# Patient Record
Sex: MALE | Race: WHITE | NOT HISPANIC OR LATINO | URBAN - METROPOLITAN AREA
[De-identification: names, ages, dates, MRNs, and addresses within clinical notes are randomized per-mention and may not be internally consistent; named-entity substitution may affect disease eponyms.]

---

## 2022-05-27 ENCOUNTER — INPATIENT (INPATIENT)
Facility: HOSPITAL | Age: 67
LOS: 3 days | Discharge: HOME | DRG: 313 | End: 2022-05-31
Attending: STUDENT IN AN ORGANIZED HEALTH CARE EDUCATION/TRAINING PROGRAM | Admitting: STUDENT IN AN ORGANIZED HEALTH CARE EDUCATION/TRAINING PROGRAM
Payer: OTHER GOVERNMENT

## 2022-05-27 VITALS
HEIGHT: 69 IN | TEMPERATURE: 98 F | RESPIRATION RATE: 20 BRPM | SYSTOLIC BLOOD PRESSURE: 171 MMHG | WEIGHT: 229.94 LBS | DIASTOLIC BLOOD PRESSURE: 101 MMHG | OXYGEN SATURATION: 95 % | HEART RATE: 81 BPM

## 2022-05-27 DIAGNOSIS — R07.9 CHEST PAIN, UNSPECIFIED: ICD-10-CM

## 2022-05-27 DIAGNOSIS — Z96.641 PRESENCE OF RIGHT ARTIFICIAL HIP JOINT: Chronic | ICD-10-CM

## 2022-05-27 LAB
ALBUMIN SERPL ELPH-MCNC: 3.4 G/DL — SIGNIFICANT CHANGE UP (ref 3.3–5)
ALP SERPL-CCNC: 57 U/L — SIGNIFICANT CHANGE UP (ref 40–120)
ALT FLD-CCNC: 36 U/L — SIGNIFICANT CHANGE UP (ref 10–45)
ANION GAP SERPL CALC-SCNC: 6 MMOL/L — SIGNIFICANT CHANGE UP (ref 5–17)
AST SERPL-CCNC: 32 U/L — SIGNIFICANT CHANGE UP (ref 10–40)
BASOPHILS # BLD AUTO: 0.06 K/UL — SIGNIFICANT CHANGE UP (ref 0–0.2)
BASOPHILS NFR BLD AUTO: 0.5 % — SIGNIFICANT CHANGE UP (ref 0–2)
BILIRUB SERPL-MCNC: 0.5 MG/DL — SIGNIFICANT CHANGE UP (ref 0.2–1.2)
BUN SERPL-MCNC: 13 MG/DL — SIGNIFICANT CHANGE UP (ref 7–23)
CALCIUM SERPL-MCNC: 8.1 MG/DL — LOW (ref 8.4–10.5)
CHLORIDE SERPL-SCNC: 94 MMOL/L — LOW (ref 96–108)
CO2 SERPL-SCNC: 29 MMOL/L — SIGNIFICANT CHANGE UP (ref 22–31)
CREAT SERPL-MCNC: 0.97 MG/DL — SIGNIFICANT CHANGE UP (ref 0.5–1.3)
D DIMER BLD IA.RAPID-MCNC: 255 NG/ML DDU — HIGH
EGFR: 87 ML/MIN/1.73M2 — SIGNIFICANT CHANGE UP
EOSINOPHIL # BLD AUTO: 0.26 K/UL — SIGNIFICANT CHANGE UP (ref 0–0.5)
EOSINOPHIL NFR BLD AUTO: 2.3 % — SIGNIFICANT CHANGE UP (ref 0–6)
GLUCOSE SERPL-MCNC: 97 MG/DL — SIGNIFICANT CHANGE UP (ref 70–99)
HCT VFR BLD CALC: 39.3 % — SIGNIFICANT CHANGE UP (ref 39–50)
HGB BLD-MCNC: 13.7 G/DL — SIGNIFICANT CHANGE UP (ref 13–17)
IMM GRANULOCYTES NFR BLD AUTO: 1 % — SIGNIFICANT CHANGE UP (ref 0–1.5)
LYMPHOCYTES # BLD AUTO: 17.7 % — SIGNIFICANT CHANGE UP (ref 13–44)
LYMPHOCYTES # BLD AUTO: 2.01 K/UL — SIGNIFICANT CHANGE UP (ref 1–3.3)
MCHC RBC-ENTMCNC: 27.7 PG — SIGNIFICANT CHANGE UP (ref 27–34)
MCHC RBC-ENTMCNC: 34.9 GM/DL — SIGNIFICANT CHANGE UP (ref 32–36)
MCV RBC AUTO: 79.6 FL — LOW (ref 80–100)
MONOCYTES # BLD AUTO: 1.06 K/UL — HIGH (ref 0–0.9)
MONOCYTES NFR BLD AUTO: 9.4 % — SIGNIFICANT CHANGE UP (ref 2–14)
NEUTROPHILS # BLD AUTO: 7.83 K/UL — HIGH (ref 1.8–7.4)
NEUTROPHILS NFR BLD AUTO: 69.1 % — SIGNIFICANT CHANGE UP (ref 43–77)
NRBC # BLD: 0 /100 WBCS — SIGNIFICANT CHANGE UP (ref 0–0)
PLATELET # BLD AUTO: 302 K/UL — SIGNIFICANT CHANGE UP (ref 150–400)
POTASSIUM SERPL-MCNC: 3.7 MMOL/L — SIGNIFICANT CHANGE UP (ref 3.5–5.3)
POTASSIUM SERPL-SCNC: 3.7 MMOL/L — SIGNIFICANT CHANGE UP (ref 3.5–5.3)
PROT SERPL-MCNC: 6.5 G/DL — SIGNIFICANT CHANGE UP (ref 6–8.3)
RBC # BLD: 4.94 M/UL — SIGNIFICANT CHANGE UP (ref 4.2–5.8)
RBC # FLD: 12.3 % — SIGNIFICANT CHANGE UP (ref 10.3–14.5)
SARS-COV-2 RNA SPEC QL NAA+PROBE: SIGNIFICANT CHANGE UP
SODIUM SERPL-SCNC: 129 MMOL/L — LOW (ref 135–145)
TROPONIN I, HIGH SENSITIVITY RESULT: 27.5 NG/L — SIGNIFICANT CHANGE UP
TROPONIN I, HIGH SENSITIVITY RESULT: 33.2 NG/L — SIGNIFICANT CHANGE UP
WBC # BLD: 11.33 K/UL — HIGH (ref 3.8–10.5)
WBC # FLD AUTO: 11.33 K/UL — HIGH (ref 3.8–10.5)

## 2022-05-27 PROCEDURE — 71045 X-RAY EXAM CHEST 1 VIEW: CPT | Mod: 26

## 2022-05-27 PROCEDURE — 99223 1ST HOSP IP/OBS HIGH 75: CPT

## 2022-05-27 PROCEDURE — 99285 EMERGENCY DEPT VISIT HI MDM: CPT

## 2022-05-27 PROCEDURE — 93010 ELECTROCARDIOGRAM REPORT: CPT

## 2022-05-27 RX ORDER — CHLORTHALIDONE 50 MG
1 TABLET ORAL
Qty: 0 | Refills: 0 | DISCHARGE

## 2022-05-27 RX ORDER — ACETAMINOPHEN 500 MG
650 TABLET ORAL EVERY 6 HOURS
Refills: 0 | Status: DISCONTINUED | OUTPATIENT
Start: 2022-05-27 | End: 2022-05-31

## 2022-05-27 RX ORDER — ASPIRIN/CALCIUM CARB/MAGNESIUM 324 MG
81 TABLET ORAL DAILY
Refills: 0 | Status: DISCONTINUED | OUTPATIENT
Start: 2022-05-28 | End: 2022-05-31

## 2022-05-27 RX ORDER — ASPIRIN/CALCIUM CARB/MAGNESIUM 324 MG
1 TABLET ORAL
Qty: 0 | Refills: 0 | DISCHARGE

## 2022-05-27 RX ORDER — LANOLIN ALCOHOL/MO/W.PET/CERES
3 CREAM (GRAM) TOPICAL AT BEDTIME
Refills: 0 | Status: DISCONTINUED | OUTPATIENT
Start: 2022-05-27 | End: 2022-05-31

## 2022-05-27 RX ORDER — LISINOPRIL 2.5 MG/1
1 TABLET ORAL
Qty: 0 | Refills: 0 | DISCHARGE

## 2022-05-27 RX ORDER — ONDANSETRON 8 MG/1
4 TABLET, FILM COATED ORAL EVERY 8 HOURS
Refills: 0 | Status: DISCONTINUED | OUTPATIENT
Start: 2022-05-27 | End: 2022-05-31

## 2022-05-27 RX ORDER — ATORVASTATIN CALCIUM 80 MG/1
80 TABLET, FILM COATED ORAL AT BEDTIME
Refills: 0 | Status: DISCONTINUED | OUTPATIENT
Start: 2022-05-27 | End: 2022-05-31

## 2022-05-27 RX ORDER — ENOXAPARIN SODIUM 100 MG/ML
40 INJECTION SUBCUTANEOUS EVERY 24 HOURS
Refills: 0 | Status: DISCONTINUED | OUTPATIENT
Start: 2022-05-27 | End: 2022-05-31

## 2022-05-27 RX ORDER — ATORVASTATIN CALCIUM 80 MG/1
1 TABLET, FILM COATED ORAL
Qty: 0 | Refills: 0 | DISCHARGE

## 2022-05-27 RX ORDER — LISINOPRIL 2.5 MG/1
40 TABLET ORAL DAILY
Refills: 0 | Status: DISCONTINUED | OUTPATIENT
Start: 2022-05-27 | End: 2022-05-31

## 2022-05-27 RX ORDER — HYDROCHLOROTHIAZIDE 25 MG
25 TABLET ORAL DAILY
Refills: 0 | Status: DISCONTINUED | OUTPATIENT
Start: 2022-05-27 | End: 2022-05-31

## 2022-05-27 RX ORDER — ASPIRIN/CALCIUM CARB/MAGNESIUM 324 MG
81 TABLET ORAL DAILY
Refills: 0 | Status: DISCONTINUED | OUTPATIENT
Start: 2022-05-27 | End: 2022-05-27

## 2022-05-27 RX ADMIN — ATORVASTATIN CALCIUM 80 MILLIGRAM(S): 80 TABLET, FILM COATED ORAL at 22:05

## 2022-05-27 RX ADMIN — ENOXAPARIN SODIUM 40 MILLIGRAM(S): 100 INJECTION SUBCUTANEOUS at 22:04

## 2022-05-27 NOTE — H&P ADULT - NSHPREVIEWOFSYSTEMS_GEN_ALL_CORE
CONSTITUTIONAL: denies fever, chills, fatigue, weakness  HEENT: denies blurred vision, sore throat  SKIN: denies new lesions, rash  CARDIOVASCULAR: denies chest pain, chest pressure, palpitations  RESPIRATORY: denies shortness of breath, sputum production  GASTROINTESTINAL: denies nausea, vomiting, diarrhea, abdominal pain  GENITOURINARY: denies dysuria, discharge  NEUROLOGICAL: denies numbness, headache, focal weakness  MUSCULOSKELETAL: denies new joint pain, muscle aches  HEMATOLOGIC: denies gross bleeding, bruising  LYMPHATICS: denies enlarged lymph nodes, extremity swelling  PSYCHIATRIC: denies recent changes in anxiety, depression  ENDOCRINOLOGIC: denies sweating, cold or heat intolerance CONSTITUTIONAL: denies fever, chills, fatigue, weakness  HEENT: denies blurred vision, sore throat  SKIN: denies new lesions, rash  CARDIOVASCULAR: admits to chest discomfort, denies chest pressure, palpitations  RESPIRATORY: denies shortness of breath, sputum production  GASTROINTESTINAL: denies nausea, vomiting, diarrhea, abdominal pain  GENITOURINARY: denies dysuria, discharge  NEUROLOGICAL: denies numbness, headache, focal weakness  MUSCULOSKELETAL: denies new joint pain, muscle aches  LYMPHATICS: denies enlarged lymph nodes, extremity swelling  PSYCHIATRIC: denies recent changes in anxiety, depression  ENDOCRINOLOGIC: denies sweating, cold or heat intolerance

## 2022-05-27 NOTE — ED ADULT NURSE NOTE - OBJECTIVE STATEMENT
Pt a&ox3 BIB EMS c/o chest pain. Per pt, he was admitted to [facility] x2 days for elevated troponins. Per EMS pt was found diaphoretic and pale at home. given 324 ASA PTA.

## 2022-05-27 NOTE — ED ADULT TRIAGE NOTE - CHIEF COMPLAINT QUOTE
Pt brought in by EMS c/o mild left sided, chest tightness. As per EMS, Pt found diaphoretic and pale at home.  done by EMS. EMS gave 324mg of aspirin.     Pt reports recently being seen at [facility] in [place], admitted x 2 days for elevated trops.

## 2022-05-27 NOTE — ED ADULT NURSE NOTE - NSIMPLEMENTINTERV_GEN_ALL_ED
Implemented All Universal Safety Interventions:  [place] to call system. Call bell, personal items and telephone within reach. Instruct patient to call for assistance. Room bathroom lighting operational. Non-slip footwear when patient is off stretcher. Physically safe environment: no spills, clutter or unnecessary equipment. Stretcher in lowest position, wheels locked, appropriate side rails in place.

## 2022-05-27 NOTE — PATIENT PROFILE ADULT - FALL HARM RISK - UNIVERSAL INTERVENTIONS
Bed in lowest position, wheels locked, appropriate side rails in place/Call bell, personal items and telephone in reach/Instruct patient to call for assistance before getting out of bed or chair/Non-slip footwear when patient is out of bed/[place] to call system/Physically safe environment - no spills, clutter or unnecessary equipment/Purposeful Proactive Rounding/Room/bathroom lighting operational, light cord in reach

## 2022-05-27 NOTE — ED PROVIDER NOTE - OBJECTIVE STATEMENT
66M h/o HTN, occasional ETOH use, [facility], had negative work up for PE, but per pt had +troponin, here today for intermittent left sided chest tightness radiating to LUE at 1pm, lasting a few mins, associated with diaphoresis, sob, nausea, exertional. Pt has not had a cardiac work up x 5 years. No pleuritic chest pain, no fevers or chills, no cough.

## 2022-05-27 NOTE — H&P ADULT - NSHPPHYSICALEXAM_GEN_ALL_CORE
T(C): 36.9 (05-27-22 @ 14:27), Max: 36.9 (05-27-22 @ 14:27)  HR: 81 (05-27-22 @ 14:27) (81 - 81)  BP: 171/101 (05-27-22 @ 14:27) (171/101 - 171/101)  RR: 20 (05-27-22 @ 14:27) (20 - 20)  SpO2: 95% (05-27-22 @ 14:27) (95% - 95%)    GENERAL: patient appears well, no acute distress, appropriate, pleasant  EYES: sclera clear, no exudates  ENMT: oropharynx clear without erythema, no exudates, moist mucous membranes  NECK: supple, soft, no thyromegaly noted  LUNGS: good air entry bilaterally, clear to auscultation, symmetric breath sounds, no wheezing or rhonchi appreciated  HEART: soft S1/S2, regular rate and rhythm, no murmurs noted, no lower extremity edema  GASTROINTESTINAL: abdomen is soft, nontender, nondistended, normoactive bowel sounds, no palpable masses  INTEGUMENT: good skin turgor, no lesions noted  MUSCULOSKELETAL: no clubbing or cyanosis, no obvious deformity  NEUROLOGIC: awake, alert, oriented x3, good muscle tone in 4 extremities, no obvious sensory deficits  PSYCHIATRIC: mood is good, affect is congruent, linear and logical thought process  HEME/LYMPH: no palpable supraclavicular nodules, no obvious ecchymosis or petechiae T(C): 36.9 (05-27-22 @ 14:27), Max: 36.9 (05-27-22 @ 14:27)  HR: 81 (05-27-22 @ 14:27) (81 - 81)  BP: 171/101 (05-27-22 @ 14:27) (171/101 - 171/101)  RR: 20 (05-27-22 @ 14:27) (20 - 20)  SpO2: 95% (05-27-22 @ 14:27) (95% - 95%)    GENERAL: patient appears well, no acute distress, appropriate, pleasant  EYES: sclera clear, no exudates  ENMT: oropharynx clear without erythema, no exudates, moist mucous membranes  NECK: supple, soft, no thyromegaly noted  LUNGS: good air entry bilaterally, clear to auscultation, symmetric breath sounds, no wheezing or rhonchi appreciated  HEART: soft S1/S2, regular rate and rhythm, no murmurs noted, no lower extremity edema, no tenderness to palpation  GASTROINTESTINAL: abdomen is soft, nontender, nondistended, normoactive bowel sounds, no palpable masses  INTEGUMENT: warm and perfused, tanned  MUSCULOSKELETAL: no clubbing or cyanosis, no obvious deformity  NEUROLOGIC: awake, alert, oriented x3, good muscle tone in 4 extremities, no obvious sensory deficits  PSYCHIATRIC: mood is good, affect is congruent, linear and logical thought process

## 2022-05-27 NOTE — H&P ADULT - NSHPSOCIALHISTORY_GEN_ALL_CORE
Lives at home with wife, live in [place]  Ambulates and performs all ADLs on his own  Denies smoking, illicit drug use, admits to occasional EtOH  Retired- owned car washing business

## 2022-05-27 NOTE — H&P ADULT - HISTORY OF PRESENT ILLNESS
66 year old M PMH HTN, occasional EtOH use coming in for chest tightness that started around 1pm and radiates to LUE. Patient states that he was admitted to [facility] in [place] last week for SOB after long car ride, workup was negative for PE as per patient but was noted to have elevated trop and work up was negative. Chest pain not present last week, associated with diaphoresis and SOB, pain is exertional and not present at rest. Patient states he has not had any cardiac workup done in 5 years. Denies fevers, chills, palpitations, abd pain.    In the ED, T 98.5F, HR 81, /101, RR 20, SpO2 95% on RA. Labs showed WBC 11.33, Na 129, trop 27.5.     EKG: ______  CXR: no acute pathology on acute read 66 year old M PMH HTN, occasional EtOH use coming in for chest tightness that started around 1pm. Patient states that he was admitted to [facility] in [place] last week for SOB after long car ride (patient drove from [place] for nephew's wedding today), workup was negative for PE as per patient but was noted to have elevated trop and work up was negative. Patient states that he always has had L sided pectoralis muscle pain but today was worse, associated with diaphoresis and SOB, pain is exertional and not present at rest. Did not radiate to jaw or back or extremities. Patient states he has not had any cardiac workup done in 5 years. Denies fevers, chills, palpitations, abd pain. Wife, Miryam, at bedside states that patient was walking about 0.5 mile this morning and did feel "weird."     In the ED, T 98.5F, HR 81, /101, RR 20, SpO2 95% on RA. Labs showed WBC 11.33, Na 129, trop 27.5.     EKG: NSR HR 75  CXR: no acute pathology on acute read 66 year old M PMH HTN, occasional EtOH use coming in for chest tightness that started around 1pm and has been consistent since. Patient states that he was admitted to [facility] in [place] last week for SOB after long car ride (patient drove from [place] for nephew's wedding today), workup was negative for PE as per patient but was noted to have elevated trop and work up was negative. Patient states that he always has had L sided pectoralis muscle pain but today was worse, associated with diaphoresis and SOB, pain is exertional and not present at rest. Did not radiate to jaw or back or extremities. Patient states he has not had any cardiac workup done in 5 years. Denies fevers, chills, palpitations, abd pain. Wife, Miryam, at bedside states that patient was walking about 0.5 mile this morning and did feel "weird."     In the ED, T 98.5F, HR 81, /101, RR 20, SpO2 95% on RA. Labs showed WBC 11.33, Na 129, trop 27.5.     EKG: NSR HR 75  CXR: no acute pathology on acute read 66 year old M PMH HTN, occasional EtOH use coming in for chest tightness that started around 1pm and has been consistent since. Patient states that he was admitted to [facility] in [place] last week for SOB after long car ride (patient drove from [place] for nephew's wedding today), workup was negative for PE as per patient but was noted to have elevated trop and work up was negative. Patient states that he always has had L sided pectoralis muscle pain but today was worse, associated with diaphoresis and SOB, pain is exertional and not present at rest. Did not radiate to jaw or back or extremities. Patient states he has not had any cardiac workup done in 5 years. Denies fevers, chills, palpitations, abd pain. Wife, Miryam, at bedside states that patient was walking about 0.5 mile this morning and did feel "weird."     In the ED, T 98.5F, HR 81, /101, RR 20, SpO2 95% on RA. Labs showed WBC 11.33, Na 129, trop 27.5. Received asa 324mg in the ambulance.    EKG: NSR HR 75  CXR: no acute pathology on acute read

## 2022-05-27 NOTE — ED PROVIDER NOTE - NS ED MD DISPO ISOLATION TYPES
[FreeTextEntry1] : using Advair intermittently\par dieting\par former smoker 20 yrs , quit 30 years ago\par has moderate LUPE ( AHI 28) , cpap 15- intermittently compliant, followed [facility] sleep\par follows with Dr Dickson  Cardiology\par no fever, chill, chest pain\par no sig sputum\par works as 
None

## 2022-05-27 NOTE — ED ADULT NURSE NOTE - CHIEF COMPLAINT QUOTE
Pt brought in by EMS c/o mild left sided, chest tightness. As per EMS, Pt found diaphoretic and pale at home.  done by EMS. EMS gave 324mg of aspirin.     Pt reports recently being seen at [facility] in [place], admitted x 2 days for elevated trops.

## 2022-05-27 NOTE — H&P ADULT - ASSESSMENT
66 year old M PMH HTN, occasional EtOH use coming in for chest tightness radiating to the LUE admitted for typical chest pain    #chest pain, r/o ACS  - admit to tele  - patient with L sided chest pain, radiating to LUE and associated with diaphoresis, will workup for r/o ACS  - first trop 27.5, follow up repeat trop and until peak  - EKG with no acute concern findings  - patient admitted last week to [facility] for PE workup, noted to have elevated trops at that time as well  - chest pain could be possible in setting of elevated BP  - follow up dimer as patient with history of long car ride  - follow up echo  - cardio consulted    #HTN  - continue home _____    #DVT ppx  - lovenox 40mg daily 66 year old M PMH HTN, occasional EtOH use coming in for chest tightness admitted for typical chest pain    #chest pain, r/o ACS  - admit to tele  - patient with L sided chest pain, associated with diaphoresis, will workup for r/o ACS  - family history significant for father with MI,  when he was 64  - EKG with no acute concern findings  - patient admitted last week to [facility] for PE workup, noted to have elevated trops at that time as well  - chest pain could be possible in setting of elevated BP  - continue home asa  - first trop 27.5, follow up repeat trop and until peak  - follow up dimer as patient with history of long car ride  - follow up echo  - follow up prBNP  - cardio consulted    #HTN  #HLD  - continue home lisinopril 40mg daily  - on chlorthalidone 25mg at home, will place on HCTZ 25mg daily and dose one STAT for elevated BP  - continue atorvastatin 80mg daily    #DVT ppx  - lovenox 40mg daily    discussed with wife Miryam at bedside, all questions answered 66 year old M PMH HTN, occasional EtOH use coming in for chest tightness admitted for typical chest pain    #chest pain, r/o ACS  - admit to tele  - patient with L sided chest pain, associated with diaphoresis, will workup for r/o ACS  - family history significant for father with MI,  when he was 64  - EKG with no acute concern findings  - patient admitted last week to [facility] for PE workup, noted to have elevated trops at that time as well  - chest pain could be possible in setting of elevated BP  - continue home asa  - first trop 27.5, follow up repeat trop and until peak  - follow up dimer as patient with history of long car ride  - follow up echo  - follow up proBNP  - cardio consulted    #HTN  #HLD  - continue home lisinopril 40mg daily  - on chlorthalidone 25mg at home, will place on HCTZ 25mg daily and dose one STAT for elevated BP  - continue atorvastatin 80mg daily    #DVT ppx  - lovenox 40mg daily    discussed with wife Miryam at bedside, all questions answered 66 year old M PMH HTN, occasional EtOH use coming in for chest tightness admitted for typical chest pain    #chest pain, r/o ACS  - admit to tele  - patient with L sided chest pain, associated with diaphoresis, will workup for r/o ACS  - family history significant for father with MI,  when he was 64  - EKG with no acute concern findings  - patient admitted last week to [facility] for PE workup, noted to have elevated trops at that time as well  - chest pain could be possible in setting of elevated BP  - continue home asa  - first trop 27.5, follow up repeat trop and until peak  - follow up dimer as patient with history of long car ride  - follow up echo  - follow up proBNP  - follow up EKG in the AM  - cardio consulted- called service    #HTN  #HLD  - continue home lisinopril 40mg daily  - on chlorthalidone 25mg at home, will place on HCTZ 25mg daily and dose one STAT for elevated BP  - continue atorvastatin 80mg daily    #DVT ppx  - lovenox 40mg daily    discussed with wife Miryam at bedside, all questions answered 66 year old M PMH HTN, occasional EtOH use coming in for chest tightness admitted for chest pain r/o ACS    #chest pain, r/o ACS  - admit to tele  - patient with L sided chest pain, associated with diaphoresis, will workup for r/o ACS  - family history significant for father with MI,  when he was 64  - EKG with no acute concern findings  - patient admitted last week to [facility] for PE workup, noted to have elevated trops at that time as well  - chest pain could be possible in setting of elevated BP  - continue home asa  - first trop 27.5, follow up repeat trop and until peak  - follow up dimer as patient with history of long car ride  - follow up echo  - follow up proBNP  - follow up EKG in the AM  - cardio consulted- called service    #HTN  #HLD  - continue home lisinopril 40mg daily  - on chlorthalidone 25mg at home, will place on HCTZ 25mg daily and dose one STAT for elevated BP  - continue atorvastatin 80mg daily    #DVT ppx  - lovenox 40mg daily    discussed with wife Miryam at bedside, all questions answered

## 2022-05-28 LAB
ALBUMIN SERPL ELPH-MCNC: 3.2 G/DL — LOW (ref 3.3–5)
ALP SERPL-CCNC: 57 U/L — SIGNIFICANT CHANGE UP (ref 40–120)
ALT FLD-CCNC: 25 U/L — SIGNIFICANT CHANGE UP (ref 10–45)
ANION GAP SERPL CALC-SCNC: 9 MMOL/L — SIGNIFICANT CHANGE UP (ref 5–17)
AST SERPL-CCNC: 27 U/L — SIGNIFICANT CHANGE UP (ref 10–40)
BASOPHILS # BLD AUTO: 0.08 K/UL — SIGNIFICANT CHANGE UP (ref 0–0.2)
BASOPHILS NFR BLD AUTO: 0.9 % — SIGNIFICANT CHANGE UP (ref 0–2)
BILIRUB SERPL-MCNC: 0.9 MG/DL — SIGNIFICANT CHANGE UP (ref 0.2–1.2)
BUN SERPL-MCNC: 13 MG/DL — SIGNIFICANT CHANGE UP (ref 7–23)
CALCIUM SERPL-MCNC: 8.9 MG/DL — SIGNIFICANT CHANGE UP (ref 8.4–10.5)
CHLORIDE SERPL-SCNC: 98 MMOL/L — SIGNIFICANT CHANGE UP (ref 96–108)
CO2 SERPL-SCNC: 29 MMOL/L — SIGNIFICANT CHANGE UP (ref 22–31)
CREAT SERPL-MCNC: 1.04 MG/DL — SIGNIFICANT CHANGE UP (ref 0.5–1.3)
EGFR: 80 ML/MIN/1.73M2 — SIGNIFICANT CHANGE UP
EOSINOPHIL # BLD AUTO: 0.59 K/UL — HIGH (ref 0–0.5)
EOSINOPHIL NFR BLD AUTO: 6.3 % — HIGH (ref 0–6)
GLUCOSE SERPL-MCNC: 93 MG/DL — SIGNIFICANT CHANGE UP (ref 70–99)
HCT VFR BLD CALC: 39.4 % — SIGNIFICANT CHANGE UP (ref 39–50)
HCV AB S/CO SERPL IA: 0.19 S/CO — SIGNIFICANT CHANGE UP (ref 0–0.99)
HCV AB SERPL-IMP: SIGNIFICANT CHANGE UP
HGB BLD-MCNC: 13.6 G/DL — SIGNIFICANT CHANGE UP (ref 13–17)
IMM GRANULOCYTES NFR BLD AUTO: 0.9 % — SIGNIFICANT CHANGE UP (ref 0–1.5)
LYMPHOCYTES # BLD AUTO: 2.3 K/UL — SIGNIFICANT CHANGE UP (ref 1–3.3)
LYMPHOCYTES # BLD AUTO: 24.5 % — SIGNIFICANT CHANGE UP (ref 13–44)
MCHC RBC-ENTMCNC: 27.5 PG — SIGNIFICANT CHANGE UP (ref 27–34)
MCHC RBC-ENTMCNC: 34.5 GM/DL — SIGNIFICANT CHANGE UP (ref 32–36)
MCV RBC AUTO: 79.6 FL — LOW (ref 80–100)
MONOCYTES # BLD AUTO: 0.83 K/UL — SIGNIFICANT CHANGE UP (ref 0–0.9)
MONOCYTES NFR BLD AUTO: 8.8 % — SIGNIFICANT CHANGE UP (ref 2–14)
NEUTROPHILS # BLD AUTO: 5.51 K/UL — SIGNIFICANT CHANGE UP (ref 1.8–7.4)
NEUTROPHILS NFR BLD AUTO: 58.6 % — SIGNIFICANT CHANGE UP (ref 43–77)
NRBC # BLD: 0 /100 WBCS — SIGNIFICANT CHANGE UP (ref 0–0)
NT-PROBNP SERPL-SCNC: 652 PG/ML — HIGH (ref 0–300)
PLATELET # BLD AUTO: 264 K/UL — SIGNIFICANT CHANGE UP (ref 150–400)
POTASSIUM SERPL-MCNC: 4.1 MMOL/L — SIGNIFICANT CHANGE UP (ref 3.5–5.3)
POTASSIUM SERPL-SCNC: 4.1 MMOL/L — SIGNIFICANT CHANGE UP (ref 3.5–5.3)
PROT SERPL-MCNC: 6.5 G/DL — SIGNIFICANT CHANGE UP (ref 6–8.3)
RBC # BLD: 4.95 M/UL — SIGNIFICANT CHANGE UP (ref 4.2–5.8)
RBC # FLD: 12.5 % — SIGNIFICANT CHANGE UP (ref 10.3–14.5)
SODIUM SERPL-SCNC: 136 MMOL/L — SIGNIFICANT CHANGE UP (ref 135–145)
TROPONIN I, HIGH SENSITIVITY RESULT: 34.1 NG/L — SIGNIFICANT CHANGE UP
TROPONIN I, HIGH SENSITIVITY RESULT: 35.5 NG/L — SIGNIFICANT CHANGE UP
WBC # BLD: 9.39 K/UL — SIGNIFICANT CHANGE UP (ref 3.8–10.5)
WBC # FLD AUTO: 9.39 K/UL — SIGNIFICANT CHANGE UP (ref 3.8–10.5)

## 2022-05-28 PROCEDURE — 99222 1ST HOSP IP/OBS MODERATE 55: CPT

## 2022-05-28 PROCEDURE — 93306 TTE W/DOPPLER COMPLETE: CPT | Mod: 26

## 2022-05-28 PROCEDURE — 99233 SBSQ HOSP IP/OBS HIGH 50: CPT

## 2022-05-28 RX ADMIN — ENOXAPARIN SODIUM 40 MILLIGRAM(S): 100 INJECTION SUBCUTANEOUS at 21:44

## 2022-05-28 RX ADMIN — ATORVASTATIN CALCIUM 80 MILLIGRAM(S): 80 TABLET, FILM COATED ORAL at 21:44

## 2022-05-28 RX ADMIN — Medication 25 MILLIGRAM(S): at 05:09

## 2022-05-28 RX ADMIN — Medication 81 MILLIGRAM(S): at 11:59

## 2022-05-28 RX ADMIN — LISINOPRIL 40 MILLIGRAM(S): 2.5 TABLET ORAL at 05:09

## 2022-05-28 NOTE — PROGRESS NOTE ADULT - ASSESSMENT
66 year old M PMH HTN, occasional EtOH use coming in for chest tightness admitted for chest pain r/o ACS    #Chest pain, r/o ACS  - Admitted to tele for L sided chest pain, associated with diaphoresis  - Family history significant for father with MI,  when he was 64  - EKG with no acute concern findings  - Patient admitted last week to [facility] for PE workup, noted to have elevated trops at that time as well  - Chest pain could be possible in setting of elevated BP  - Continue home asa  - Troponin 27 -- 33 -- 34, follow up AM trop  - D-Dimer 255  - Follow up echo  - Follow up proBNP  - AM EKG  - Cardiology consult pending     #HTN  #HLD  - Continue home lisinopril 40mg daily  - On chlorthalidone 25mg at home, while admitted with keep on HCTZ 25mg daily. Received one dose one STAT for elevated BP yesterday. Now normotensive   - Continue atorvastatin 80mg daily    #DVT ppx  - Lovenox 40mg daily    Wife Miryam  [phone] 66 year old M PMH HTN, occasional EtOH use coming in for chest tightness admitted for chest pain r/o ACS    #Chest pain, r/o ACS  - Admitted to tele for L sided chest pain, associated with diaphoresis  - Family history significant for father with MI,  when he was 64  - EKG with no acute concern findings  - Patient admitted last week to [facility] for PE workup, noted to have elevated trops at that time as well  - Chest pain could be possible in setting of elevated BP  - Continue home asa  - Troponin 27 -- 33 -- 34, follow up AM trop  - D-Dimer 255  - Follow up echo  - Follow up proBNP  - AM EKG  - Cardiology consult pending     #HTN  #HLD  - Continue home lisinopril 40mg daily  - On chlorthalidone 25mg at home, while admitted with keep on HCTZ 25mg daily.   - Continue atorvastatin 80mg daily    #DVT ppx  - Lovenox 40mg daily    Wife Miryam  [phone] 66 year old M PMH HTN, occasional EtOH use coming in for chest tightness admitted for chest pain r/o ACS    #Chest pain, r/o ACS  - Admitted to tele for L sided chest pain, associated with diaphoresis, now resolved   - Family history significant for father with MI,  when he was 64  - EKG with no acute concern findings  - Patient admitted last week to [facility] for PE workup, noted to have elevated trops at that time as well.   - Chest pain could be possible in setting of elevated BP  - Continue home asa  - Troponin 27 -- 33 -- 34 -- 35  - D-Dimer 255 --> Earlier this week at [facility] patient was admitted for elevated trop, he reports that he had a nuclear medicine scan but unsure of details. Patient was not told at any point that he had a PE  - Pro BNP slightly elevated at 652  - Follow up echo  - f/u AM EKG  - Cardiology consult pending     #HTN  #HLD  - Continue home lisinopril 40mg daily  - On chlorthalidone 25mg at home, while admitted with keep on HCTZ 25mg daily.   - Continue atorvastatin 80mg daily    #DVT ppx  - Lovenox 40mg daily    Wife is Miryam  [phone] 66 year old M PMH HTN, occasional EtOH use coming in for chest tightness admitted for chest pain r/o ACS    #Chest pain, r/o ACS  - Admitted to tele for L sided chest pain, associated with diaphoresis, now resolved   - Family history significant for father with MI,  when he was 64  - EKG with no acute concern findings  - Patient admitted last week to [facility] for PE workup, noted to have elevated trops at that time as well.   - Chest pain could be possible in setting of elevated BP  - Continue home asa  - Troponin 27 -- 33 -- 34 -- 35  - D-Dimer 255 --> Earlier this week at [facility] patient was admitted for elevated trop, he reports that he had a nuclear medicine scan but unsure of details. Patient was not told at any point that he had a PE  - Pro BNP slightly elevated at 652  - Follow up echo  - f/u AM EKG  - Cardiology consult pending     #HTN  #HLD  - Continue home lisinopril 40mg daily  - On chlorthalidone 25mg at home, while admitted with keep on HCTZ 25mg daily.   - Continue atorvastatin 80mg daily    #DVT ppx  - Lovenox 40mg daily    : Updated wife Miryma at bedside  [phone]

## 2022-05-28 NOTE — PROGRESS NOTE ADULT - NS ATTEND AMEND GEN_ALL_CORE FT
Discussed case w/ Dr. Marley - [facility], [facility] get [facility] if not done recently.  ECG reviewed by me with ST-T wave elevation.   Troponin trended and flat.  tele reviewed: SR 63-66  CXR personal read without evidence of infiltrate. Discussed case w/ Dr. Marley - [facility], may get a stress test if not done recently.  ECG reviewed by me with ST-T wave elevation.   Troponin trended and flat.  tele reviewed: SR 63-66  Echo w/o gross abnormalities.  CXR personal read without evidence of infiltrate.

## 2022-05-28 NOTE — CONSULT NOTE ADULT - SUBJECTIVE AND OBJECTIVE BOX
Chief Complaint:   66 year old M PMH HTN, occasional EtOH use coming in for chest tightness that started around 1pm and has been consistent since. Patient states that he was admitted to [facility] in [place] last week for SOB after long car ride (patient drove from [place] for nephew's wedding today), workup was negative for PE as per patient but was noted to have elevated trop and work up was negative. Patient states that he always has had L sided pectoralis muscle pain but today was worse, associated with diaphoresis and SOB, pain is exertional and not present at rest. Did not radiate to jaw or back or extremities. Patient states he has not had any cardiac workup done in 5 years. Denies fevers, chills, palpitations, abd pain. Wife, Miryam, at bedside states that patient was walking about 0.5 mile this morning and did feel "weird." In the ED, T 98.5F, HR 81, /101, RR 20, SpO2 95% on RA. Labs showed WBC 11.33, Na 129, trop 27.5. Received asa 324mg in the ambulance.    EKG: NSR HR 75  CXR: no acute pathology on acute read    HPI:    PMH:   HTN (hypertension)      PSH:   History of right hip replacement      Family History:  FAMILY HISTORY:  FH: CAD (coronary artery disease) (Father)        Social History:  Smoking:  Alcohol:  Drugs:    Allergies:  No Known Allergies      Medications:  acetaminophen     Tablet .. 650 milliGRAM(s) Oral every 6 hours PRN  aluminum hydroxide/magnesium hydroxide/simethicone Suspension 30 milliLiter(s) Oral every 4 hours PRN  aspirin enteric coated 81 milliGRAM(s) Oral daily  atorvastatin 80 milliGRAM(s) Oral at bedtime  enoxaparin Injectable 40 milliGRAM(s) SubCutaneous every 24 hours  hydrochlorothiazide 25 milliGRAM(s) Oral daily  lisinopril 40 milliGRAM(s) Oral daily  melatonin 3 milliGRAM(s) Oral at bedtime PRN  ondansetron Injectable 4 milliGRAM(s) IV Push every 8 hours PRN      REVIEW OF SYSTEMS:  CONSTITUTIONAL: No fever, weight loss, or fatigue  EYES: No eye pain, visual disturbances, or discharge  ENMT:  No difficulty hearing, tinnitus, vertigo; No sinus or throat pain  NECK: No pain or stiffness  BREASTS: No pain, masses, or nipple discharge  RESPIRATORY: No cough, wheezing, chills or hemoptysis; No shortness of breath  CARDIOVASCULAR: No chest pain, palpitations, dizziness, or leg swelling  GASTROINTESTINAL: No abdominal or epigastric pain. No nausea, vomiting, or hematemesis; No diarrhea or constipation. No melena or hematochezia.  GENITOURINARY: No dysuria, frequency, hematuria, or incontinence  NEUROLOGICAL: No headaches, memory loss, loss of strength, numbness, or tremors  SKIN: No itching, burning, rashes, or lesions   LYMPH NODES: No enlarged glands  ENDOCRINE: No heat or cold intolerance; No hair loss  MUSCULOSKELETAL: No joint pain or swelling; No muscle, back, or extremity pain  PSYCHIATRIC: No depression, anxiety, mood swings, or difficulty sleeping  HEME/LYMPH: No easy bruising, or bleeding gums  ALLERY AND IMMUNOLOGIC: No hives or eczema    Physical Exam:  T(C): 36.8 (05-28-22 @ 15:59), Max: 37.2 (05-27-22 @ 19:41)  HR: 63 (05-28-22 @ 15:59) (62 - 67)  BP: 126/78 (05-28-22 @ 15:59) (103/72 - 140/79)  RR: 17 (05-28-22 @ 15:59) (13 - 20)  SpO2: 94% (05-28-22 @ 15:59) (94% - 95%)  Wt(kg): --    GENERAL: NAD, well-groomed, well-developed well tanned  HEAD:  Atraumatic, Normocephalic  EYES: EOMI, conjunctiva and sclera clear  ENT: Moist mucous membranes,  NECK: Supple, No JVD, no bruits  CHEST/LUNG: Clear to percussion bilaterally; No rales, rhonchi, wheezing, or rubs  HEART: Regular rate and rhythm; No murmurs, rubs, or gallops PMI non displaced.  ABDOMEN: Soft, Nontender, Nondistended; Bowel sounds present  EXTREMITIES:  2+ Peripheral Pulses, No clubbing, cyanosis, or edema  SKIN: No rashes or lesions  NERVOUS SYSTEM:  Cranial Nerves II-XII intact     Cardiovascular Diagnostic Testing:  ECG:    < from: 12 Lead ECG (05.27.22 @ 14:23) >  Diagnosis Line Normal sinus rhythm  Counterclockwise rotation  Nonspecific intraventricular conduction delay  consider rbbb terminal S wave I and v6  Borderline ECG  No previous ECGs available    Confirmed by BLAINE MICHAEL MD ([id number]) on 5/28/2022 9:12:31 AM    < end of copied text >      ECHO:    < from: TTE Echo Complete w/o Contrast w/ Doppler (05.28.22 @ 10:23) >  Summary:   1. Left ventricular ejection fraction, by visual estimation, is 60 to   65%.   2. Normal global left ventricular systolic function.   3. Increased LV wall thickness.   4. Spectral Doppler shows impaired relaxation pattern of left   ventricular myocardial filling (Grade I diastolic dysfunction).   5. Normal left atrial size.   6. Normal right atrial size.   7. Thickening of the anterior and posterior mitral valve leaflets.   8. Trace mitral valve regurgitation.    [id number] Blaine Michael , Electronically signed on 5/28/2022 at   1:09:32 PM      *** Final ***    < end of copied text >      Labs:                        13.6   9.39  )-----------( 264      ( 28 May 2022 06:30 )             39.4     05-28    136  |  98  |  13  ----------------------------<  93  4.1   |  29  |  1.04    Ca    8.9      28 May 2022 06:30    TPro  6.5  /  Alb  3.2<L>  /  TBili  0.9  /  DBili  x   /  AST  27  /  ALT  25  /  AlkPhos  57  05-28      Serum Pro-Brain Natriuretic Peptide: 652 pg/mL (05-28 @ 06:30)    Imaging:    Complicated gentleman now with 2 admissions with chest pains diaphoresis and abnormal EKG. he drinks alcohol but denies smoking    a pulmonary work-up with VQ scan was reportedly negative D-dimer borderline at 255 EKG is borderline abnormal with a bundle branch block . echo demonstrates preserved LV function. he was told of possible mucous plugging.     would consider patient for further ischemic evaluation such as stress testing nuclear technique or plain technique . would consider patient for cardiac CTA versus cardiac catheterization pending above     care is coordinated with friend at bedside. i have request requested for records from [facility]  Chief Complaint:   66 year old M PMH HTN, occasional EtOH use coming in for chest tightness that started around 1pm and has been consistent since. Patient states that he was admitted to [facility] in [place] last week for SOB after long car ride (patient drove from [place] for nephew's wedding today), workup was negative for PE as per patient but was noted to have elevated trop and work up was negative. Patient states that he always has had L sided pectoralis muscle pain but today was worse, associated with diaphoresis and SOB, pain is exertional and not present at rest. Did not radiate to jaw or back or extremities. Patient states he has not had any cardiac workup done in 5 years. Denies fevers, chills, palpitations, abd pain. Wife, Miryam, at bedside states that patient was walking about 0.5 mile this morning and did feel "weird." In the ED, T 98.5F, HR 81, /101, RR 20, SpO2 95% on RA. Labs showed WBC 11.33, Na 129, trop 27.5. Received asa 324mg in the ambulance.    EKG: NSR HR 75  CXR: no acute pathology on acute read    HPI:    PMH:   HTN (hypertension)      PSH:   History of right hip replacement      Family History:  FAMILY HISTORY:  FH: CAD (coronary artery disease) (Father)        Social History:  Smoking:  Alcohol:  Drugs:    Allergies:  No Known Allergies      Medications:  acetaminophen     Tablet .. 650 milliGRAM(s) Oral every 6 hours PRN  aluminum hydroxide/magnesium hydroxide/simethicone Suspension 30 milliLiter(s) Oral every 4 hours PRN  aspirin enteric coated 81 milliGRAM(s) Oral daily  atorvastatin 80 milliGRAM(s) Oral at bedtime  enoxaparin Injectable 40 milliGRAM(s) SubCutaneous every 24 hours  hydrochlorothiazide 25 milliGRAM(s) Oral daily  lisinopril 40 milliGRAM(s) Oral daily  melatonin 3 milliGRAM(s) Oral at bedtime PRN  ondansetron Injectable 4 milliGRAM(s) IV Push every 8 hours PRN      REVIEW OF SYSTEMS:  CONSTITUTIONAL: No fever, weight loss, or fatigue  EYES: No eye pain, visual disturbances, or discharge  ENMT:  No difficulty hearing, tinnitus, vertigo; No sinus or throat pain  NECK: No pain or stiffness  BREASTS: No pain, masses, or nipple discharge  RESPIRATORY: No cough, wheezing, chills or hemoptysis; No shortness of breath  CARDIOVASCULAR: No chest pain, palpitations, dizziness, or leg swelling  GASTROINTESTINAL: No abdominal or epigastric pain. No nausea, vomiting, or hematemesis; No diarrhea or constipation. No melena or hematochezia.  GENITOURINARY: No dysuria, frequency, hematuria, or incontinence  NEUROLOGICAL: No headaches, memory loss, loss of strength, numbness, or tremors  SKIN: No itching, burning, rashes, or lesions   LYMPH NODES: No enlarged glands  ENDOCRINE: No heat or cold intolerance; No hair loss  MUSCULOSKELETAL: No joint pain or swelling; No muscle, back, or extremity pain  PSYCHIATRIC: No depression, anxiety, mood swings, or difficulty sleeping  HEME/LYMPH: No easy bruising, or bleeding gums  ALLERY AND IMMUNOLOGIC: No hives or eczema    Physical Exam:  T(C): 36.8 (05-28-22 @ 15:59), Max: 37.2 (05-27-22 @ 19:41)  HR: 63 (05-28-22 @ 15:59) (62 - 67)  BP: 126/78 (05-28-22 @ 15:59) (103/72 - 140/79)  RR: 17 (05-28-22 @ 15:59) (13 - 20)  SpO2: 94% (05-28-22 @ 15:59) (94% - 95%)  Wt(kg): --    GENERAL: NAD, well-groomed, well-developed well tanned  HEAD:  Atraumatic, Normocephalic  EYES: EOMI, conjunctiva and sclera clear  ENT: Moist mucous membranes,  NECK: Supple, No JVD, no bruits  CHEST/LUNG: Clear to percussion bilaterally; No rales, rhonchi, wheezing, or rubs  HEART: Regular rate and rhythm; No murmurs, rubs, or gallops PMI non displaced.  ABDOMEN: Soft, Nontender, Nondistended; Bowel sounds present  EXTREMITIES:  2+ Peripheral Pulses, No clubbing, cyanosis, or edema  SKIN: No rashes or lesions  NERVOUS SYSTEM:  Cranial Nerves II-XII intact     Cardiovascular Diagnostic Testing:  ECG:    < from: 12 Lead ECG (05.27.22 @ 14:23) >  Diagnosis Line Normal sinus rhythm  Counterclockwise rotation  Nonspecific intraventricular conduction delay  consider rbbb terminal S wave I and v6  Borderline ECG  No previous ECGs available    Confirmed by BLAINE MICHAEL MD ([id number]) on 5/28/2022 9:12:31 AM    < end of copied text >      ECHO:    < from: TTE Echo Complete w/o Contrast w/ Doppler (05.28.22 @ 10:23) >  Summary:   1. Left ventricular ejection fraction, by visual estimation, is 60 to   65%.   2. Normal global left ventricular systolic function.   3. Increased LV wall thickness.   4. Spectral Doppler shows impaired relaxation pattern of left   ventricular myocardial filling (Grade I diastolic dysfunction).   5. Normal left atrial size.   6. Normal right atrial size.   7. Thickening of the anterior and posterior mitral valve leaflets.   8. Trace mitral valve regurgitation.    [id number] Blaine Michael , Electronically signed on 5/28/2022 at   1:09:32 PM      *** Final ***    < end of copied text >      Labs:                        13.6   9.39  )-----------( 264      ( 28 May 2022 06:30 )             39.4     05-28    136  |  98  |  13  ----------------------------<  93  4.1   |  29  |  1.04    Ca    8.9      28 May 2022 06:30    TPro  6.5  /  Alb  3.2<L>  /  TBili  0.9  /  DBili  x   /  AST  27  /  ALT  25  /  AlkPhos  57  05-28      Serum Pro-Brain Natriuretic Peptide: 652 pg/mL (05-28 @ 06:30)    Imaging:    Complicated gentleman now with 2 admissions with chest pains diaphoresis and abnormal EKG. he drinks alcohol but denies smoking   a pulmonary work-up with VQ scan was reportedly negative D-dimer borderline at 255 EKG is borderline abnormal with a bundle branch block . echo demonstrates preserved LV function. he was told of possible mucous plugging.     Serum troponin 27/33/34/35 represent intermediate cardiac riskwould consider patient for further ischemic evaluation such as stress testing nuclear technique or plain technique . would consider patient for cardiac CTA versus cardiac catheterization pending above     care is coordinated with friend at bedside. i have request requested for records from [facility]

## 2022-05-29 PROCEDURE — 99232 SBSQ HOSP IP/OBS MODERATE 35: CPT | Mod: 25

## 2022-05-29 PROCEDURE — 93016 CV STRESS TEST SUPVJ ONLY: CPT

## 2022-05-29 PROCEDURE — 99233 SBSQ HOSP IP/OBS HIGH 50: CPT

## 2022-05-29 PROCEDURE — 93018 CV STRESS TEST I&R ONLY: CPT

## 2022-05-29 RX ADMIN — Medication 25 MILLIGRAM(S): at 05:22

## 2022-05-29 RX ADMIN — ATORVASTATIN CALCIUM 80 MILLIGRAM(S): 80 TABLET, FILM COATED ORAL at 21:40

## 2022-05-29 RX ADMIN — Medication 81 MILLIGRAM(S): at 11:08

## 2022-05-29 RX ADMIN — LISINOPRIL 40 MILLIGRAM(S): 2.5 TABLET ORAL at 05:22

## 2022-05-29 RX ADMIN — ENOXAPARIN SODIUM 40 MILLIGRAM(S): 100 INJECTION SUBCUTANEOUS at 21:39

## 2022-05-29 NOTE — PROGRESS NOTE ADULT - NS ATTEND AMEND GEN_ALL_CORE FT
Treadmill stress test today. He may get a nuclear stress test pending the result.    High level MDM: ordered stress test, reviewed telemetry.

## 2022-05-29 NOTE — PROGRESS NOTE ADULT - ASSESSMENT
66 year old M PMH HTN, occasional EtOH use coming in for chest tightness admitted for chest pain r/o ACS    #Chest pain, r/o ACS  - Admitted to tele for L sided chest pain, associated with diaphoresis, now resolved   - Family history significant for father with MI,  when he was 64  - [facility] noted to have elevated trops at that time as well.   - Discussed case w/ Dr. Marley - [facility], may get a stress test if not done recently.  - Troponin trended and flat.  - Tele reviewed: SR 55-  - Echo w/o gross abnormalities.  - Continue home asa  - Pro BNP slightly elevated at 652  - Cardiology following - follow up regarding nuclear stress     #HTN  #HLD  - Continue home lisinopril 40mg daily  - On chlorthalidone 25mg at home, while admitted with keep on HCTZ 25mg daily.   - Continue atorvastatin 80mg daily    #DVT ppx  - Lovenox 40mg daily    : Updated wife Miryam at bedside  [phone] 66 year old M PMH HTN, occasional EtOH use coming in for chest tightness admitted for chest pain r/o ACS    #Chest pain, r/o ACS  - Admitted to tele for L sided chest pain, associated with diaphoresis, now resolved   - Family history significant for father with MI,  when he was 64  - [facility], noted to have elevated trops at that time as well.   - Discussed case w/ Dr. Marley - [facility], may get a stress test if not done recently.  - Troponin trended and flat.  - Tele reviewed: SR -  - Echo w/o gross abnormalities.  - Continue home asa  - Pro BNP slightly elevated at 652  - Cardiology following - follow up regarding nuclear stress     #HTN  #HLD  - Continue home lisinopril 40mg daily  - On chlorthalidone 25mg at home, while admitted with keep on HCTZ 25mg daily.   - Continue atorvastatin 80mg daily    #DVT ppx  - Lovenox 40mg daily    Dispo: Pending nuclear stress    Wife Miryam [phone] - updated at bedside  66 year old M PMH HTN, occasional EtOH use coming in for chest tightness admitted for chest pain r/o ACS    #Chest pain, r/o ACS  - Admitted to tele for L sided chest pain, associated with diaphoresis, now resolved   - Family history significant for father with MI,  when he was 64  - Patient admitted last week to [facility] for PE workup, noted to have elevated trops at that time as well.   - Discussed case w/ Dr. Marley - he is getting a treadmill stress test today.  - Troponin trended and flat.  - Tele reviewed: SR 55-  - Echo w/o gross abnormalities.  - Continue home asa  - Pro BNP slightly elevated at 652  - Cardiology following - follow up regarding nuclear stress     #HTN  #HLD  - Continue home lisinopril 40mg daily  - On chlorthalidone 25mg at home, while admitted with keep on HCTZ 25mg daily.   - Continue atorvastatin 80mg daily    #DVT ppx  - Lovenox 40mg daily    Dispo: Pending nuclear stress    Wife Miryam [phone] - updated at bedside

## 2022-05-30 PROCEDURE — 99232 SBSQ HOSP IP/OBS MODERATE 35: CPT

## 2022-05-30 RX ADMIN — LISINOPRIL 40 MILLIGRAM(S): 2.5 TABLET ORAL at 05:39

## 2022-05-30 RX ADMIN — Medication 81 MILLIGRAM(S): at 11:09

## 2022-05-30 RX ADMIN — Medication 25 MILLIGRAM(S): at 05:39

## 2022-05-30 RX ADMIN — Medication 650 MILLIGRAM(S): at 09:48

## 2022-05-30 RX ADMIN — ENOXAPARIN SODIUM 40 MILLIGRAM(S): 100 INJECTION SUBCUTANEOUS at 21:21

## 2022-05-30 RX ADMIN — ATORVASTATIN CALCIUM 80 MILLIGRAM(S): 80 TABLET, FILM COATED ORAL at 21:22

## 2022-05-30 RX ADMIN — Medication 650 MILLIGRAM(S): at 10:00

## 2022-05-30 NOTE — PROGRESS NOTE ADULT - NS ATTEND AMEND GEN_ALL_CORE FT
Discussed case w/ Dr. Marley from Cardiology. He had a normal treadmill stress test yesterday. Plan for a CT coronary at [facility] tomorrow - dr. Marley is helping to coordinate. He may be able to go home afterward.    patient complaining to me today of a post viral lingering cough - no f/c/phlegm production.

## 2022-05-30 NOTE — PROGRESS NOTE ADULT - ASSESSMENT
66 year old M PMH HTN, occasional EtOH use coming in for chest tightness admitted for chest pain r/o ACS    #Chest pain, r/o ACS  - Admitted to tele for L sided chest pain, associated with diaphoresis, now resolved   - Family history significant for father with MI,  when he was 64  - Patient admitted last week to [facility] for PE workup, noted to have elevated trops at that time as well.   - Troponin trended and flat.  - Tele reviewed: SR 56-  - Echo w/o gross abnormalities.  - Continue home asa  - Pro BNP slightly elevated at 652  - Cardiology following - Discussed with Dr Marley, patient had treadmill stress yesterday, Dr Marley arranging for CT coronary at [facility]     #HTN  #HLD  - Continue home lisinopril 40mg daily  - On chlorthalidone 25mg at home, while admitted with keep on HCTZ 25mg daily.   - Continue atorvastatin 80mg daily    #DVT ppx  - Lovenox 40mg daily    Dispo: Pending CT coronary at [facility], anticipate tomorrow     Wife Miryam [phone]

## 2022-05-31 ENCOUNTER — OUTPATIENT (OUTPATIENT)
Dept: OUTPATIENT SERVICES | Facility: HOSPITAL | Age: 67
LOS: 1 days | End: 2022-05-31
Payer: COMMERCIAL

## 2022-05-31 VITALS
TEMPERATURE: 98 F | HEART RATE: 66 BPM | SYSTOLIC BLOOD PRESSURE: 130 MMHG | OXYGEN SATURATION: 96 % | DIASTOLIC BLOOD PRESSURE: 79 MMHG | RESPIRATION RATE: 19 BRPM

## 2022-05-31 DIAGNOSIS — R07.9 CHEST PAIN, UNSPECIFIED: ICD-10-CM

## 2022-05-31 DIAGNOSIS — Z96.641 PRESENCE OF RIGHT ARTIFICIAL HIP JOINT: Chronic | ICD-10-CM

## 2022-05-31 PROBLEM — I10 ESSENTIAL (PRIMARY) HYPERTENSION: Chronic | Status: ACTIVE | Noted: 2022-05-27

## 2022-05-31 PROCEDURE — 71045 X-RAY EXAM CHEST 1 VIEW: CPT

## 2022-05-31 PROCEDURE — 85025 COMPLETE CBC W/AUTO DIFF WBC: CPT

## 2022-05-31 PROCEDURE — 36415 COLL VENOUS BLD VENIPUNCTURE: CPT

## 2022-05-31 PROCEDURE — 75574 CT ANGIO HRT W/3D IMAGE: CPT

## 2022-05-31 PROCEDURE — 83880 ASSAY OF NATRIURETIC PEPTIDE: CPT

## 2022-05-31 PROCEDURE — 99232 SBSQ HOSP IP/OBS MODERATE 35: CPT

## 2022-05-31 PROCEDURE — 75574 CT ANGIO HRT W/3D IMAGE: CPT | Mod: 26,MH

## 2022-05-31 PROCEDURE — 93017 CV STRESS TEST TRACING ONLY: CPT

## 2022-05-31 PROCEDURE — 99239 HOSP IP/OBS DSCHRG MGMT >30: CPT

## 2022-05-31 PROCEDURE — 84484 ASSAY OF TROPONIN QUANT: CPT

## 2022-05-31 PROCEDURE — 93005 ELECTROCARDIOGRAM TRACING: CPT

## 2022-05-31 PROCEDURE — 86803 HEPATITIS C AB TEST: CPT

## 2022-05-31 PROCEDURE — 93306 TTE W/DOPPLER COMPLETE: CPT

## 2022-05-31 PROCEDURE — 80053 COMPREHEN METABOLIC PANEL: CPT

## 2022-05-31 PROCEDURE — 85379 FIBRIN DEGRADATION QUANT: CPT

## 2022-05-31 PROCEDURE — 99285 EMERGENCY DEPT VISIT HI MDM: CPT | Mod: 25

## 2022-05-31 PROCEDURE — 87635 SARS-COV-2 COVID-19 AMP PRB: CPT

## 2022-05-31 RX ADMIN — LISINOPRIL 40 MILLIGRAM(S): 2.5 TABLET ORAL at 05:15

## 2022-05-31 RX ADMIN — Medication 25 MILLIGRAM(S): at 05:15

## 2022-05-31 RX ADMIN — Medication 81 MILLIGRAM(S): at 12:21

## 2022-05-31 NOTE — DISCHARGE NOTE NURSING/CASE MANAGEMENT/SOCIAL WORK - NSDCPEFALRISK_GEN_ALL_CORE
For information on Fall & Injury Prevention, visit: [email or web address] OR  [email or web address] OR  https://www.cdc.gov/steadi/patient.html

## 2022-05-31 NOTE — PROGRESS NOTE ADULT - ASSESSMENT
66 year old M with PMH HTN, occasional EtOH use coming in for chest tightness admitted for chest pain r/o ACS.    #Chest pain, r/o ACS  - Family history significant for father with MI,  when he was 64  - Patient admitted last week to [facility] for PE workup, noted to have elevated trops at that time as well.   - Troponin trended and flat.  - Tele reviewed: SR 61>65  - Echo:  EF 60>65%, grade 1 dysfunction  - Continue home ASA, Statin  - pt had normal Stress Test yesterday  - Pro BNP slightly elevated at 652  - Cardiology following - pt for shuttle today for CT coronaries at [facility]     #HTN  #HLD  - Continue home lisinopril 40mg daily  - On chlorthalidone 25mg at home, while admitted with keep on HCTZ 25mg daily.   - Continue atorvastatin 80mg daily    #DVT ppx  - Lovenox 40mg daily    Dispo: Pending CT coronary at [facility], pt states he will update his wife on plan of care.

## 2022-05-31 NOTE — DISCHARGE NOTE PROVIDER - NSDCCPCAREPLAN_GEN_ALL_CORE_FT
PRINCIPAL DISCHARGE DIAGNOSIS  Diagnosis: Chest pain  Assessment and Plan of Treatment: -you had a cardiac evaluation which included bloodwork; cardiac enzymes were negative   -you  and an Echocardiogram that was normal  -you had an Exercise Stress Test which was normal  -you were evaluated by a Cardiologist  -you had a CAT scan of your coronary arteries; results showed       PRINCIPAL DISCHARGE DIAGNOSIS  Diagnosis: Chest pain  Assessment and Plan of Treatment: -you had a cardiac evaluation which included bloodwork; cardiac enzymes were negative   -you  and an Echocardiogram that was normal  -you had an Exercise Stress Test which was normal  -you were evaluated by a Cardiologist  -you had a CAT scan of your coronary arteries; results w/ no significant stenosis, mild/minimal narrowing.

## 2022-05-31 NOTE — DISCHARGE NOTE PROVIDER - ATTENDING DISCHARGE PHYSICAL EXAMINATION:
T(C): 36.4 (05-31-22 @ 12:41), Max: 36.8 (05-31-22 @ 08:30)  HR: 66 (05-31-22 @ 12:41) (66 - 69)  BP: 130/79 (05-31-22 @ 12:41) (117/76 - 130/79)  RR: 19 (05-31-22 @ 12:41) (12 - 19)  SpO2: 96% (05-31-22 @ 12:41) (91% - 96%)  Wt(kg): --Vital Signs Last 24 Hrs  T(C): 36.4 (31 May 2022 12:41), Max: 36.8 (31 May 2022 08:30)  T(F): 97.5 (31 May 2022 12:41), Max: 98.2 (31 May 2022 08:30)  HR: 66 (31 May 2022 12:41) (66 - 69)  BP: 130/79 (31 May 2022 12:41) (117/76 - 130/79)  BP(mean): --  RR: 19 (31 May 2022 12:41) (12 - 19)  SpO2: 96% (31 May 2022 12:41) (91% - 96%)    PHYSICAL EXAM:  GENERAL: NAD, well-groomed, well-developed  HEAD:  Atraumatic, Normocephalic  EYES: EOMI, PERRLA, conjunctiva and sclera clear  ENMT: No tonsillar erythema, exudates, or enlargement; Moist mucous membranes, Good dentition, No lesions  NECK: Supple, No JVD, Normal thyroid  NERVOUS SYSTEM:  Alert & Oriented X3, Good concentration; Motor Strength 5/5 B/L upper and lower extremities  CHEST/LUNG: Clear to auscultation bilaterally; No rales, rhonchi, wheezing, or rubs  HEART: Regular rate and rhythm; No murmurs, rubs, or gallops  ABDOMEN: Soft, Nontender, Nondistended; Bowel sounds present  EXTREMITIES:  No clubbing, cyanosis, or edema  SKIN: No rashes or lesions, hyperpigmented from tan

## 2022-05-31 NOTE — DISCHARGE NOTE NURSING/CASE MANAGEMENT/SOCIAL WORK - PATIENT PORTAL LINK FT
You can access the FollowMyHealth Patient Portal offered by [facility] by registering at the following website: [email or web address]. By joining [email or web address]’s FollowMyHealth portal, you will also be able to view your health information using other applications (apps) compatible with our system.

## 2022-05-31 NOTE — DISCHARGE NOTE PROVIDER - NSDCMRMEDTOKEN_GEN_ALL_CORE_FT
aspirin 81 mg oral delayed release tablet: 1 tab(s) orally once a day  atorvastatin 80 mg oral tablet: 1 tab(s) orally once a day  chlorthalidone 25 mg oral tablet: 1 tab(s) orally once a day  lisinopril 40 mg oral tablet: 1 tab(s) orally once a day  sildenafil 100 mg oral tablet: 1 tab(s) orally once a day, As Needed

## 2022-05-31 NOTE — PROGRESS NOTE ADULT - SUBJECTIVE AND OBJECTIVE BOX
Patient is a 66y old  Male who presents with a chief complaint of chest pain (29 May 2022 15:47)    Patient seen and examined at bedside. No overnight events reported. Patient c/o some left shoulder and neck tightness     ALLERGIES:  No Known Allergies    MEDICATIONS  (STANDING):  aspirin enteric coated 81 milliGRAM(s) Oral daily  atorvastatin 80 milliGRAM(s) Oral at bedtime  enoxaparin Injectable 40 milliGRAM(s) SubCutaneous every 24 hours  hydrochlorothiazide 25 milliGRAM(s) Oral daily  lisinopril 40 milliGRAM(s) Oral daily    MEDICATIONS  (PRN):  acetaminophen     Tablet .. 650 milliGRAM(s) Oral every 6 hours PRN Temp greater or equal to 38C (100.4F), Mild Pain (1 - 3)  aluminum hydroxide/magnesium hydroxide/simethicone Suspension 30 milliLiter(s) Oral every 4 hours PRN Dyspepsia  melatonin 3 milliGRAM(s) Oral at bedtime PRN Insomnia  ondansetron Injectable 4 milliGRAM(s) IV Push every 8 hours PRN Nausea and/or Vomiting    Vital Signs Last 24 Hrs  T(F): 97.7 (30 May 2022 08:20), Max: 98.2 (29 May 2022 15:30)  HR: 67 (30 May 2022 08:20) (61 - 88)  BP: 134/80 (30 May 2022 08:20) (110/64 - 138/81)  RR: 14 (30 May 2022 08:20) (12 - 18)  SpO2: 99% (30 May 2022 08:20) (93% - 100%)    PHYSICAL EXAM:  General: NAD, A/O x 3  ENT: No gross hearing impairment, Moist mucous membranes, no thrush  Neck: Supple, No JVD  Lungs: Clear to auscultation bilaterally, good air entry, non-labored breathing  Cardio: RRR, S1/S2, No murmur  Abdomen: Soft, Nontender, Nondistended; Bowel sounds present  Extremities: No calf tenderness, No cyanosis, No pitting edema  Psych: Appropriate mood and affect    LABS:                        13.6   9.39  )-----------( 264      ( 28 May 2022 06:30 )             39.4     05-28    136  |  98  |  13  ----------------------------<  93  4.1   |  29  |  1.04    Ca    8.9      28 May 2022 06:30    TPro  6.5  /  Alb  3.2  /  TBili  0.9  /  DBili  x   /  AST  27  /  ALT  25  /  AlkPhos  57  05-28    CARDIAC MARKERS ( 28 May 2022 06:45 )  x     / 35.5 ng/L / x     / x     / x      CARDIAC MARKERS ( 28 May 2022 02:30 )  x     / 34.1 ng/L / x     / x     / x      CARDIAC MARKERS ( 27 May 2022 15:30 )  x     / 33.2 ng/L / x     / x     / x      CARDIAC MARKERS ( 27 May 2022 14:40 )  x     / 27.5 ng/L / x     / x     / x        COVID-19 PCR: NotDetec (05-27-22 @ 15:10)    RADIOLOGY & ADDITIONAL TESTS:    Care Discussed with Consultants/Other Providers:   
Follow up for  SUBJ:    prior history of "chest tightness"    PMH  HTN (hypertension)        MEDICATIONS  (STANDING):  aspirin enteric coated 81 milliGRAM(s) Oral daily  atorvastatin 80 milliGRAM(s) Oral at bedtime  enoxaparin Injectable 40 milliGRAM(s) SubCutaneous every 24 hours  hydrochlorothiazide 25 milliGRAM(s) Oral daily  lisinopril 40 milliGRAM(s) Oral daily    MEDICATIONS  (PRN):  acetaminophen     Tablet .. 650 milliGRAM(s) Oral every 6 hours PRN Temp greater or equal to 38C (100.4F), Mild Pain (1 - 3)  aluminum hydroxide/magnesium hydroxide/simethicone Suspension 30 milliLiter(s) Oral every 4 hours PRN Dyspepsia  melatonin 3 milliGRAM(s) Oral at bedtime PRN Insomnia  ondansetron Injectable 4 milliGRAM(s) IV Push every 8 hours PRN Nausea and/or Vomiting        PHYSICAL EXAM:  Vital Signs Last 24 Hrs  T(C): 36.6 (30 May 2022 15:00), Max: 36.7 (29 May 2022 20:04)  T(F): 97.9 (30 May 2022 15:00), Max: 98.1 (29 May 2022 20:04)  HR: 64 (30 May 2022 15:00) (61 - 88)  BP: 133/80 (30 May 2022 15:00) (110/64 - 138/81)  BP(mean): 79 (29 May 2022 23:22) (79 - 79)  RR: 14 (30 May 2022 15:00) (12 - 18)  SpO2: 92% (30 May 2022 15:00) (92% - 99%)    GENERAL: NAD, well-groomed, well-developed  HEAD:  Atraumatic, Normocephalic  EYES: EOMI, PERRLA, conjunctiva and sclera clear  ENT: Moist mucous membranes,  NECK: Supple, No JVD, no bruits  CHEST/LUNG: Clear to percussion bilaterally; No rales, rhonchi, wheezing, or rubs  HEART: Regular rate and rhythm; No murmurs, rubs, or gallops PMI non displaced.  ABDOMEN: Soft, Nontender, Nondistended; Bowel sounds present  EXTREMITIES:  2+ Peripheral Pulses, No clubbing, cyanosis, or edema  SKIN: No rashes or lesions  NERVOUS SYSTEM:  Cranial Nerves II-XII intact      TELEMETRY:  rsr  ECG:  ECHO:    LABS:        I&O's Summary    BNP    RADIOLOGY & ADDITIONAL STUDIES:    ECHO:      Plan   cardiac CTA to improve sensitivity patient amenable forms completed anticipate evaluation at [facility] 5/31/22    
Follow up for cp  SUBJ:    comfortable    PMH  HTN (hypertension)        MEDICATIONS  (STANDING):  aspirin enteric coated 81 milliGRAM(s) Oral daily  atorvastatin 80 milliGRAM(s) Oral at bedtime  enoxaparin Injectable 40 milliGRAM(s) SubCutaneous every 24 hours  hydrochlorothiazide 25 milliGRAM(s) Oral daily  lisinopril 40 milliGRAM(s) Oral daily    MEDICATIONS  (PRN):  acetaminophen     Tablet .. 650 milliGRAM(s) Oral every 6 hours PRN Temp greater or equal to 38C (100.4F), Mild Pain (1 - 3)  aluminum hydroxide/magnesium hydroxide/simethicone Suspension 30 milliLiter(s) Oral every 4 hours PRN Dyspepsia  melatonin 3 milliGRAM(s) Oral at bedtime PRN Insomnia  ondansetron Injectable 4 milliGRAM(s) IV Push every 8 hours PRN Nausea and/or Vomiting        PHYSICAL EXAM:  Vital Signs Last 24 Hrs  T(C): 36.8 (29 May 2022 15:30), Max: 36.9 (28 May 2022 19:47)  T(F): 98.2 (29 May 2022 15:30), Max: 98.5 (28 May 2022 19:47)  HR: 71 (29 May 2022 15:30) (59 - 72)  BP: 112/60 (29 May 2022 15:30) (112/60 - 133/77)  BP(mean): --  RR: 17 (29 May 2022 15:30) (13 - 17)  SpO2: 93% (29 May 2022 15:30) (93% - 100%)    GENERAL: NAD, well-groomed, well-developed  HEAD:  Atraumatic, Normocephalic  EYES: EOMI, PERRLA, conjunctiva and sclera clear  ENT: Moist mucous membranes,  NECK: Supple, No JVD, no bruits  CHEST/LUNG: Clear to percussion bilaterally; No rales, rhonchi, wheezing, or rubs  HEART: Regular rate and rhythm; No murmurs, rubs, or gallops PMI non displaced.  ABDOMEN: Soft, Nontender, Nondistended; Bowel sounds present  EXTREMITIES:  2+ Peripheral Pulses, No clubbing, cyanosis, or edema  SKIN: No rashes or lesions  NERVOUS SYSTEM:  Cranial Nerves II-XII intact      TELEMETRY:  rsr    ECG:  ECHO:    LABS:                        13.6   9.39  )-----------( 264      ( 28 May 2022 06:30 )             39.4     05-28    136  |  98  |  13  ----------------------------<  93  4.1   |  29  |  1.04    Ca    8.9      28 May 2022 06:30    TPro  6.5  /  Alb  3.2<L>  /  TBili  0.9  /  DBili  x   /  AST  27  /  ALT  25  /  AlkPhos  57  05-28    I&O's Summary    BNP    RADIOLOGY & ADDITIONAL STUDIES:    ECHO:      impression  ddimer 255 t 27.5/33/34/36      chest pains  I have asked jessica to undergo detailed cardiac testing in order to evaluate his overall cardiovascular risk. An assessment of both structural and functional heart disease was recommended to the patient.  In this regard, an echocardiogram and a stress test were advised to the patient. exercise stress test plain is negative today would consider patient for We discussed the pros and cons of plain treadmill stress testing nuclear stress testing and angiography including a sensitivity analysis.    consider mpi vs cardiac cta       
Patient is a 66y old  Male who presents with a chief complaint of chest pain (28 May 2022 18:19)    Patient seen and examined at bedside. No overnight events reported. Patient is resting comfortably.    ALLERGIES:  No Known Allergies    MEDICATIONS  (STANDING):  aspirin enteric coated 81 milliGRAM(s) Oral daily  atorvastatin 80 milliGRAM(s) Oral at bedtime  enoxaparin Injectable 40 milliGRAM(s) SubCutaneous every 24 hours  hydrochlorothiazide 25 milliGRAM(s) Oral daily  lisinopril 40 milliGRAM(s) Oral daily    MEDICATIONS  (PRN):  acetaminophen     Tablet .. 650 milliGRAM(s) Oral every 6 hours PRN Temp greater or equal to 38C (100.4F), Mild Pain (1 - 3)  aluminum hydroxide/magnesium hydroxide/simethicone Suspension 30 milliLiter(s) Oral every 4 hours PRN Dyspepsia  melatonin 3 milliGRAM(s) Oral at bedtime PRN Insomnia  ondansetron Injectable 4 milliGRAM(s) IV Push every 8 hours PRN Nausea and/or Vomiting    Vital Signs Last 24 Hrs  T(F): 98.3 (29 May 2022 08:08), Max: 98.5 (28 May 2022 19:47)  HR: 59 (29 May 2022 08:08) (59 - 71)  BP: 128/76 (29 May 2022 08:08) (123/74 - 133/77)  RR: 13 (29 May 2022 08:08) (13 - 20)  SpO2: 95% (29 May 2022 08:08) (94% - 95%)    PHYSICAL EXAM:  General: NAD, A/O x 3  ENT: No gross hearing impairment, Moist mucous membranes, no thrush  Neck: Supple, No JVD  Lungs: Clear to auscultation bilaterally, good air entry, non-labored breathing  Cardio: RRR, S1/S2, No murmur  Abdomen: Soft, Nontender, Nondistended; Bowel sounds present  Extremities: No calf tenderness, No cyanosis, No pitting edema  Psych: Appropriate mood and affect    LABS:                        13.6   9.39  )-----------( 264      ( 28 May 2022 06:30 )             39.4     05-28    136  |  98  |  13  ----------------------------<  93  4.1   |  29  |  1.04    Ca    8.9      28 May 2022 06:30    TPro  6.5  /  Alb  3.2  /  TBili  0.9  /  DBili  x   /  AST  27  /  ALT  25  /  AlkPhos  57  05-28    CARDIAC MARKERS ( 28 May 2022 06:45 )  x     / 35.5 ng/L / x     / x     / x      CARDIAC MARKERS ( 28 May 2022 02:30 )  x     / 34.1 ng/L / x     / x     / x      CARDIAC MARKERS ( 27 May 2022 15:30 )  x     / 33.2 ng/L / x     / x     / x      CARDIAC MARKERS ( 27 May 2022 14:40 )  x     / 27.5 ng/L / x     / x     / x        COVID-19 PCR: NotDetec (05-27-22 @ 15:10)    RADIOLOGY & ADDITIONAL TESTS:    Care Discussed with Consultants/Other Providers:   
Patient is a 66y old  Male who presents with a chief complaint of chest pain (30 May 2022 17:07)      Patient seen and examined at bedside. No overnight events reported.  Pt. with no chest pain or shortness of breath.    ALLERGIES:  No Known Allergies    MEDICATIONS  (STANDING):  aspirin enteric coated 81 milliGRAM(s) Oral daily  atorvastatin 80 milliGRAM(s) Oral at bedtime  enoxaparin Injectable 40 milliGRAM(s) SubCutaneous every 24 hours  hydrochlorothiazide 25 milliGRAM(s) Oral daily  lisinopril 40 milliGRAM(s) Oral daily    MEDICATIONS  (PRN):  acetaminophen     Tablet .. 650 milliGRAM(s) Oral every 6 hours PRN Temp greater or equal to 38C (100.4F), Mild Pain (1 - 3)  aluminum hydroxide/magnesium hydroxide/simethicone Suspension 30 milliLiter(s) Oral every 4 hours PRN Dyspepsia  melatonin 3 milliGRAM(s) Oral at bedtime PRN Insomnia  ondansetron Injectable 4 milliGRAM(s) IV Push every 8 hours PRN Nausea and/or Vomiting    Vital Signs Last 24 Hrs  T(F): 98.2 (31 May 2022 08:30), Max: 98.2 (31 May 2022 08:30)  HR: 69 (31 May 2022 08:30) (64 - 69)  BP: 128/77 (31 May 2022 08:30) (117/76 - 133/80)  RR: 17 (31 May 2022 08:30) (12 - 17)  SpO2: 93% (31 May 2022 08:30) (91% - 96%)  I&O's Summary    30 May 2022 07:01  -  31 May 2022 07:00  --------------------------------------------------------  IN: 222 mL / OUT: 0 mL / NET: 222 mL      PHYSICAL EXAM:  General: NAD, A/O x 3  ENT: No gross hearing impairment, Moist mucous membranes, no thrush  Neck: Supple, No JVD  Lungs: Clear to auscultation bilaterally, good air entry, non-labored breathing  Cardio: RRR, S1/S2, No murmur  Abdomen: Soft, Nontender, Nondistended; Bowel sounds present  Extremities: No calf tenderness, No cyanosis, No pitting edema  Psych: Appropriate mood and affect    LABS:      COVID-19 PCR: NotDetec (05-27-22 @ 15:10)    RADIOLOGY & ADDITIONAL TESTS:  < from: TTE Echo Complete w/o Contrast w/ Doppler (05.28.22 @ 10:23) >    Summary:   1. Left ventricular ejection fraction, by visual estimation, is 60 to   65%.   2. Normal global left ventricular systolic function.   3. Increased LV wall thickness.   4. Spectral Doppler shows impaired relaxation pattern of left   ventricular myocardial filling (Grade I diastolic dysfunction).   5. Normal left atrial size.   6. Normal right atrial size.   7. Thickening of the anterior and posterior mitral valve leaflets.   8. Trace mitral valve regurgitation.  < from: Xray Chest 1 View- PORTABLE-Urgent (05.27.22 @ 15:00) >    FINDINGS: Heart sizeappears within normal limits. No hilar or superior   mediastinal abnormalities are identified. There is no evidence for focal   infiltrate, lobar consolidation or pulmonary edema. No pleural effusion   or pneumothorax is demonstrated. No mediastinal shift is noted. The   visualized osseous structures appear unremarkable. Elevation left   hemidiaphragm.    IMPRESSION: No evidence for focal infiltrate or lobar consolidation.    < end of copied text >      < end of copied text >    Care Discussed with Consultants/Other Providers:   
Follow up for :  c/p        SUBJ:  no c/p now    PMH  HTN (hypertension)        MEDICATIONS  (STANDING):  aspirin enteric coated 81 milliGRAM(s) Oral daily  atorvastatin 80 milliGRAM(s) Oral at bedtime  enoxaparin Injectable 40 milliGRAM(s) SubCutaneous every 24 hours  hydrochlorothiazide 25 milliGRAM(s) Oral daily  lisinopril 40 milliGRAM(s) Oral daily    MEDICATIONS  (PRN):  acetaminophen     Tablet .. 650 milliGRAM(s) Oral every 6 hours PRN Temp greater or equal to 38C (100.4F), Mild Pain (1 - 3)  aluminum hydroxide/magnesium hydroxide/simethicone Suspension 30 milliLiter(s) Oral every 4 hours PRN Dyspepsia  melatonin 3 milliGRAM(s) Oral at bedtime PRN Insomnia  ondansetron Injectable 4 milliGRAM(s) IV Push every 8 hours PRN Nausea and/or Vomiting        PHYSICAL EXAM:  Vital Signs Last 24 Hrs  T(C): 36.8 (31 May 2022 08:30), Max: 36.8 (31 May 2022 08:30)  T(F): 98.2 (31 May 2022 08:30), Max: 98.2 (31 May 2022 08:30)  HR: 69 (31 May 2022 08:30) (64 - 69)  BP: 128/77 (31 May 2022 08:30) (117/76 - 133/80)  BP(mean): --  RR: 17 (31 May 2022 08:30) (12 - 17)  SpO2: 93% (31 May 2022 08:30) (91% - 96%)    GENERAL: NAD, well-groomed, well-developed  HEAD:  Atraumatic, Normocephalic  EYES:  conjunctiva and sclera clear  ENT: Moist mucous membranes,  NECK: Supple, No JVD, no bruits  CHEST/LUNG: Clear to auscultation bilaterally; No rales, rhonchi, wheezing, or rubs  HEART: Regular rate and rhythm; No murmurs, rubs, or gallops PMI non displaced.  ABDOMEN: Soft, Nontender, Nondistended; Bowel sounds present  EXTREMITIES:  No clubbing, cyanosis, or edema  NERVOUS SYSTEM:  Alert       TELEMETRY:  sinus    ECG:  < from: 12 Lead ECG (22 @ 14:23) >    Ventricular Rate 75 BPM    Atrial Rate 75 BPM    P-R Interval 160 ms    QRS Duration 120 ms    Q-T Interval 410 ms    QTC Calculation(Bazett) 457 ms    P Axis 4 degrees    R Axis 0 degrees    T Axis -3 degrees    Diagnosis Line Normal sinus rhythm  Counterclockwise rotation  Nonspecific intraventricular conduction delay  consider rbbb terminal S wave I and v6  Borderline ECG  No previous ECGs available    Confirmed by BLAINE MICHAEL MD () on 2022 9:12:31 AM    < end of copied text >      I&O's Summary    30 May 2022 07:01  -  31 May 2022 07:00  --------------------------------------------------------  IN: 222 mL / OUT: 0 mL / NET: 222 mL          RADIOLOGY & ADDITIONAL STUDIES:  < from: 12 Lead ECG (22 @ 14:23) >    Ventricular Rate 75 BPM    Atrial Rate 75 BPM    P-R Interval 160 ms    QRS Duration 120 ms    Q-T Interval 410 ms    QTC Calculation(Bazett) 457 ms    P Axis 4 degrees    R Axis 0 degrees    T Axis -3 degrees    Diagnosis Line Normal sinus rhythm  Counterclockwise rotation  Nonspecific intraventricular conduction delay  consider rbbb terminal S wave I and v6  Borderline ECG  No previous ECGs available    Confirmed by BLAINE MICHAEL MD () on 2022 9:12:31 AM    < end of copied text >      ECHO:  < from: TTE Echo Complete w/o Contrast w/ Doppler (22 @ 10:23) >    ACC: [id number] EXAM:  ECHO TTE WO CON COMP W DOPP                          PROCEDURE DATE:  2022          INTERPRETATION:  TRANSTHORACIC ECHOCARDIOGRAM REPORT        Patient Name:   ELKE OKEEFE Patient Location: [address] Rec #:  [id number]     Accession #:      [id number]  Account #:      [id number]      Height:           68.9 in 175.0 cm  YOB: 1955     Weight:           229.3 lb 104.00 kg  Patient Age:    66 years     BSA:              2.19 m²  Patient Gender: M   BP:               103/72 mmHg      Date of Exam:        2022 10:23:12 AM  Sonographer:         DANY  Referring Physician: LUZ MARIA    Procedure:     2D Echo/Doppler/Color Doppler Complete.  Indications:   Chest pain, unspecified - R07.9  Diagnosis:     Chest pain, unspecified - R07.9  Study Details: Technically adequate study. Study quality was adversely   affected                 due to body habitus.        2D AND M-MODE MEASUREMENTS (normal ranges within parentheses):  Left Ventricle:           Normal   Aorta/Left Atrium:               Normal  IVSd (2D):              1.60 cm (0.7-1.1) Aortic Root (2D):    3.09 cm   (2.4-3.7)  LVPWd (2D):             1.26 cm (0.7-1.1) Aortic Root (Mmode): 2.95 cm   (2.4-3.7)  LVIDd (2D):  4.26 cm (3.4-5.7) AoV Cusp Separation: 2.42 cm   (1.5-2.6)  LVIDs (2D):             2.71 cm           Left Atrium (Mmode): 3.34 cm   (1.9-4.0)  LV FS (2D):             36.3 %   (>25%)  Relative Wall Thickness  0.59    (<0.42)    LV DIASTOLIC FUNCTION:  MV Peak E: 0.42 m/s Decel Time: 398 msec  MV Peak A: 0.67 m/s  E/A Ratio: 0.62    SPECTRAL DOPPLER ANALYSIS (where applicable):  Mitral Valve:  MV P1/2 Time: 115.51 msec  MV Area, PHT: 1.90 cm²    Aortic Valve: AoV Max Dayday: 1.16 m/s AoV Peak P.3 mmHg AoV Mean PG:   3.3 mmHg    LVOT Vmax: 1.11 m/s LVOT VTI: 0.171 m LVOT Diameter: 2.20 cm    AoV Area, Vmax: 3.66 cm² AoV Area, VTI: 2.66 cm² AoV Area, Vmn: 2.98 cm²  Ao VTI: 0.244  Tricuspid Valve and PA/RV Systolic Pressure: TR Max Velocity: 0.50 m/s RA   Pressure: 10 mmHg RVSP/PASP: 11.0 mmHg      PHYSICIAN INTERPRETATION:  Left Ventricle: The left ventricular internal cavity size is normal. Left   ventricular wall thickness is increased. Increased relative wall   thickness with normal mass index consistent with left ventricular   concentric remodeling.  Global LV systolic function was normal. Left ventricular ejection   fraction, by visual estimation, is 60 to 65%. Spectral Doppler shows   impaired relaxation pattern of left ventricular myocardial filling (Grade   I diastolic dysfunction).      LV Wall Scoring:  All segments are normal.    Right Ventricle: The right ventricular size is normal.  Left Atrium: Normal left atrial size.  Right Atrium: Normal right atrial size.  Pericardium: There is no evidence of pericardial effusion.  Mitral Valve: Thickening of the anterior and posterior mitral valve   leaflets. Trace mitral valve regurgitation is seen.  Tricuspid Valve: The tricuspid valve is normal in structure. Trivial   tricuspid regurgitation is visualized.  Aortic Valve: The aortic valve is trileaflet.  Pulmonic Valve: Structurally normal pulmonic valve, with normal leaflet   excursion. The pulmonic valve is normal. Trace pulmonic valve   regurgitation.  Aorta: The aortic root is normal in size and structure.      Summary:   1. Left ventricular ejection fraction, by visual estimation, is 60 to   65%.   2. Normal global left ventricular systolic function.   3. Increased LV wall thickness.   4. Spectral Doppler shows impaired relaxation pattern of left   ventricular myocardial filling (Grade I diastolic dysfunction).   5. Normal left atrial size.   6. Normal right atrial size.   7. Thickening of the anterior and posterior mitral valve leaflets.   8. Trace mitral valve regurgitation.    [id number] Blaine Michael , Electronically signed on 2022 at   1:09:32 PM      < end of copied text >        
Patient is a 66y old  Male who presents with a chief complaint of chest pain (28 May 2022 07:34)    Patient seen and examined at bedside. No overnight events reported. Patient currently resting comfortably without complaint, he does report that he feels chest pain could be related to congestion/MSK pain, had recent URI with cough. No chest pain or SOB at this time.     ALLERGIES:  No Known Allergies    MEDICATIONS  (STANDING):  aspirin enteric coated 81 milliGRAM(s) Oral daily  atorvastatin 80 milliGRAM(s) Oral at bedtime  enoxaparin Injectable 40 milliGRAM(s) SubCutaneous every 24 hours  hydrochlorothiazide 25 milliGRAM(s) Oral daily  lisinopril 40 milliGRAM(s) Oral daily    MEDICATIONS  (PRN):  acetaminophen     Tablet .. 650 milliGRAM(s) Oral every 6 hours PRN Temp greater or equal to 38C (100.4F), Mild Pain (1 - 3)  aluminum hydroxide/magnesium hydroxide/simethicone Suspension 30 milliLiter(s) Oral every 4 hours PRN Dyspepsia  melatonin 3 milliGRAM(s) Oral at bedtime PRN Insomnia  ondansetron Injectable 4 milliGRAM(s) IV Push every 8 hours PRN Nausea and/or Vomiting    Vital Signs Last 24 Hrs  T(F): 97.5 (28 May 2022 08:11), Max: 98.9 (27 May 2022 19:41)  HR: 62 (28 May 2022 08:11) (62 - 81)  BP: 133/79 (28 May 2022 08:11) (103/72 - 171/101)  RR: 13 (28 May 2022 08:11) (13 - 20)  SpO2: 95% (28 May 2022 08:11) (94% - 98%)    PHYSICAL EXAM:  General: NAD, A/O x 3  ENT: No gross hearing impairment, Moist mucous membranes, no thrush  Neck: Supple, No JVD  Lungs: Clear to auscultation bilaterally, good air entry, non-labored breathing  Cardio: RRR, S1/S2, No murmur  Abdomen: Soft, Nontender, Nondistended; Bowel sounds present  Extremities: No calf tenderness, No cyanosis, No pitting edema  Psych: Appropriate mood and affect    LABS:                        13.6   9.39  )-----------( 264      ( 28 May 2022 06:30 )             39.4     05-27    129  |  94  |  13  ----------------------------<  97  3.7   |  29  |  0.97    Ca    8.1      27 May 2022 14:40    TPro  6.5  /  Alb  3.4  /  TBili  0.5  /  DBili  x   /  AST  32  /  ALT  36  /  AlkPhos  57  05-27      CARDIAC MARKERS ( 28 May 2022 06:45 )  x     / 35.5 ng/L / x     / x     / x      CARDIAC MARKERS ( 28 May 2022 02:30 )  x     / 34.1 ng/L / x     / x     / x      CARDIAC MARKERS ( 27 May 2022 15:30 )  x     / 33.2 ng/L / x     / x     / x      CARDIAC MARKERS ( 27 May 2022 14:40 )  x     / 27.5 ng/L / x     / x     / x          COVID-19 PCR: NotDetec (05-27-22 @ 15:10)    RADIOLOGY & ADDITIONAL TESTS:    < from: Xray Chest 1 View- PORTABLE-Urgent (05.27.22 @ 15:00) >    ACC: [id number] EXAM:  XR CHEST PORTABLE URGENT 1V                          PROCEDURE DATE:  05/27/2022          INTERPRETATION:  INDICATION: Chest Pain    PRIORS: None    VIEWS: Portable AP radiography of the chest performed.    FINDINGS: Heart sizeappears within normal limits. No hilar or superior   mediastinal abnormalities are identified. There is no evidence for focal   infiltrate, lobar consolidation or pulmonary edema. No pleural effusion   or pneumothorax is demonstrated. No mediastinal shift is noted. The   visualized osseous structures appear unremarkable. Elevation left   hemidiaphragm.    IMPRESSION: No evidence for focal infiltrate or lobar consolidation.    --- End of Report ---    < end of copied text >      Care Discussed with Consultants/Other Providers:

## 2022-05-31 NOTE — PHARMACOTHERAPY INTERVENTION NOTE - COMMENTS
discussed current medications with patient   patient expressed understanding  all questions answered

## 2022-05-31 NOTE — PROGRESS NOTE ADULT - NS ATTEND AMEND GEN_ALL_CORE FT
normal treadmill stress test yesterday. Plan for a CT coronary at [facility] today. Discussed w/ Dr. Guan.

## 2022-05-31 NOTE — DISCHARGE NOTE PROVIDER - HOSPITAL COURSE
Hospital Course    66 year old M with PMH HTN, occasional EtOH use coming in for chest tightness admitted for chest pain r/o ACS.   Family history significant for father with MI,  when he was 64.   Patient admitted last week to [facility] for PE workup, noted to have elevated trops at that time as well.    Troponin trended and flat.  No arrhythmias on tele monitor.   Echo:  EF 60>65%, grade 1 dysfunction.  He will cont home ASA, Statin.  He had a normal Stress Test.  He was evaluated by Cardiology; rec is for CTA of coronaries; he was shuttled to [facility].  Results:       Discharging Provider:  IRWIN Sampson  Contact Info: Cell [phone] - Please call with any questions or concerns.    Outpatient Provider:         Hospital Course    66 year old M with PMH HTN, occasional EtOH use coming in for chest tightness admitted for chest pain r/o ACS.   Family history significant for father with MI,  when he was 64.   Patient admitted last week to [facility] for PE workup, noted to have elevated trops at that time as well.    Troponin trended and flat.  No arrhythmias on tele monitor.   Echo:  EF 60>65%, grade 1 dysfunction.  He will cont home ASA, Statin.  He had a normal Stress Test.  He was evaluated by Cardiology; rec is for CTA of coronaries; he was shuttled to [facility].    Results:     1.  No significant stenosis.  2.  Mild luminal narrowing of the proximal circumflex coronary artery.  3.  Minimal luminal narrowing of the proximal and mid left anterior  descending coronary artery.  4.  Concentric thickening of the left ventricular myocardium.      Discharging Provider:  IRWIN Sampson  Contact Info: Cell [phone] - Please call with any questions or concerns.    Outpatient Provider:    Attending addendum:  I have personally seen and examined patient on the above date.  I discussed the case with Violette Anderson ACP and I agree with findings and plan as detailed per note above, which I have amended where appropriate.    Gisel Guillen MD         Hospital Course    66 year old M with PMH HTN, occasional EtOH use coming in for chest tightness admitted for chest pain r/o ACS.   Family history significant for father with MI,  when he was 64.   Patient admitted last week to [facility] for PE workup, noted to have elevated trops at that time as well.    Troponin trended and flat.  No arrhythmias on tele monitor.   Echo:  EF 60>65%, grade 1 dysfunction.  He will cont home ASA, Statin.  He had a normal treadmill Stress Test.  He was evaluated by Cardiology; rec is for CTA of coronaries; he was shuttled to [facility].    Results:     1.  No significant stenosis.  2.  Mild luminal narrowing of the proximal circumflex coronary artery.  3.  Minimal luminal narrowing of the proximal and mid left anterior  descending coronary artery.  4.  Concentric thickening of the left ventricular myocardium.      Discharging Provider:  IRWIN Sampson  Contact Info: Cell [phone] - Please call with any questions or concerns.    Outpatient Provider:    Attending addendum:  I have personally seen and examined patient on the above date.  I discussed the case with Violette Anderson ACP and I agree with findings and plan as detailed per note above, which I have amended where appropriate.    Gisel Guillen MD

## 2023-08-22 NOTE — PROGRESS NOTE ADULT - REASON FOR ADMISSION
chest pain
Zoryve Pregnancy And Lactation Text: It is unknown if this medication can cause problems during pregnancy and breastfeeding.

## 2024-07-31 NOTE — PROGRESS NOTE ADULT - RESPIRATORY AND THORAX
No protocol for requested medication.    Medication: Zepbound 5 MG/0.5ML   Last office visit date: 6/17/24  NOV: 8/20/24  Pharmacy: [facility] [address] [place] [address]    Order pended, routed to clinician for review.      negative

## 2025-04-03 NOTE — ED ADULT NURSE NOTE - TEMPLATE LIST FOR HEAD TO TOE ASSESSMENT
4/3/2025= Spoke to patient. Instructions sent via patient portal in [employer].    Juliette Mr Guerra,    Your procedure is scheduled for 4/7/2025 at [facility] with Dr Chew. Please arrive at 8 am.     [facility]  [address] [place] [address]      Hospital Nurse will call prior to the procedure with more instructions and details. PER THE HOSPITAL, YOU WILL NEED A RESPONSIBLE ADULT TO DRIVE YOU TO AND FROM THE HOSPITAL. NO LYFT, UBER, PUBLIC TRANSPORTATION. Thank you.    1. Nothing to eat or drink after midnight the night before the procedure.  2. ONLY take Amiodarone and Eliquis day of the procedure with some water.  3. Have your blood work done by 4/4/2025. Your orders are in the chart. You may go to any [facility], ACL lab or [facility] facility. No need to fast.    If you have any questions in regards to these instructions, please reply back or call the office at [phone]. Thank you!   Cardiac